# Patient Record
Sex: MALE | Race: WHITE | NOT HISPANIC OR LATINO | Employment: STUDENT | URBAN - METROPOLITAN AREA
[De-identification: names, ages, dates, MRNs, and addresses within clinical notes are randomized per-mention and may not be internally consistent; named-entity substitution may affect disease eponyms.]

---
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
## 2019-12-20 ENCOUNTER — APPOINTMENT (OUTPATIENT)
Dept: RADIOLOGY | Facility: CLINIC | Age: 19
End: 2019-12-20
Payer: COMMERCIAL

## 2019-12-20 ENCOUNTER — OFFICE VISIT (OUTPATIENT)
Dept: OBGYN CLINIC | Facility: CLINIC | Age: 19
End: 2019-12-20
Payer: COMMERCIAL

## 2019-12-20 VITALS
DIASTOLIC BLOOD PRESSURE: 65 MMHG | WEIGHT: 160 LBS | SYSTOLIC BLOOD PRESSURE: 107 MMHG | HEIGHT: 70 IN | BODY MASS INDEX: 22.9 KG/M2 | HEART RATE: 71 BPM

## 2019-12-20 DIAGNOSIS — M25.312 SHOULDER INSTABILITY, LEFT: ICD-10-CM

## 2019-12-20 DIAGNOSIS — S43.015A ANTERIOR DISLOCATION OF LEFT SHOULDER, INITIAL ENCOUNTER: Primary | ICD-10-CM

## 2019-12-20 DIAGNOSIS — M25.512 LEFT SHOULDER PAIN, UNSPECIFIED CHRONICITY: ICD-10-CM

## 2019-12-20 PROCEDURE — 99214 OFFICE O/P EST MOD 30 MIN: CPT | Performed by: ORTHOPAEDIC SURGERY

## 2019-12-20 PROCEDURE — 73030 X-RAY EXAM OF SHOULDER: CPT

## 2019-12-20 NOTE — PROGRESS NOTES
Assessment/Plan:  1  Anterior dislocation of left shoulder, initial encounter  XR shoulder 2+ vw left    MRI arthrogram left shoulder    FL injection left shoulder (arthrogram)   2  Shoulder instability, left  MRI arthrogram left shoulder    FL injection left shoulder (arthrogram)     Criss Aviles has left-sided shoulder pain consistent with shoulder dislocation and appears to have glenohumeral instability with multiple dislocations since that injury  I am concerned of his instability in the shoulder that he may have an underlying labral injury  Given his physical demand in the [employer] it is warranted to obtain an MR arthrogram at this time to rule out labral tear  If he does not have an underlying labral tear we could consider having him begin conservative treatment of physical therapy  I will see him back in the office after the MRI is complete  Subjective:   Mady Chavez  is a 20 y o  male who presents to the office for evaluation for left shoulder injury  He states that he had a first-time shoulder dislocation around 1 month ago  He was wrestling with a friend and his shoulder popped out and then popped right back in  He had pain and discomfort in the left shoulder for the next few days  He then had a second shoulder dislocation which occurred 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  He had his shoulder reduced by 1 of the on call docs in the [employer]  He did not have any x-rays at that time  He states that he has had ongoing shoulder pain ever since that injury  His pain is aching and throbbing and constant over the anterior aspect of the left shoulder  It worsens with any overhead movement or lifting his arm to his side  He denies any numbness and tingling down his arm to his hand  He does have a history of injury to this left shoulder over 1 year ago consistent with an AC sprain and partial trap injury  This was treated conservatively    He has never reported any previous shoulder dislocation in the past       Review of Systems      Past Medical History:   Diagnosis Date    Concussion        History reviewed  No pertinent surgical history  Family History   Problem Relation Age of Onset    No Known Problems Mother     No Known Problems Father     No Known Problems Sister     No Known Problems Brother     No Known Problems Maternal Aunt     No Known Problems Maternal Uncle     No Known Problems Paternal Aunt     No Known Problems Paternal Uncle     No Known Problems Maternal Grandmother     No Known Problems Maternal Grandfather     No Known Problems Paternal Grandmother        Social History     Occupational History    Not on file   Tobacco Use    Smoking status: Never Smoker    Smokeless tobacco: Never Used   Substance and Sexual Activity    Alcohol use: No    Drug use: No    Sexual activity: Not on file       No current outpatient medications on file  No Known Allergies    Objective:  Vitals:    12/20/19 1347   BP: 107/65   Pulse: 71       Left Shoulder Exam     Tenderness   Left shoulder tenderness location: Tenderness to palpation over anterior and lateral aspect of left shoulder  Range of Motion   Active abduction:  80 abnormal   Passive abduction:  110 abnormal   Forward flexion:  130 abnormal   Internal rotation 0 degrees:  Sacrum abnormal     Muscle Strength   Abduction: 5/5   Internal rotation: 5/5   External rotation: 5/5   Supraspinatus: 5/5   Subscapularis: 5/5   Biceps: 5/5     Tests   Apprehension: positive  Barney test: positive  Impingement: positive  Drop arm: negative    Other   Erythema: absent  Sensation: normal  Pulse: present     Comments:  Positive Saltillo's test            Physical Exam   Constitutional: He is oriented to person, place, and time  He appears well-developed and well-nourished  HENT:   Head: Normocephalic and atraumatic  Eyes: Pupils are equal, round, and reactive to light   Conjunctivae are normal    Neck: Normal range of motion  Neck supple  Cardiovascular: Normal rate and intact distal pulses  Pulmonary/Chest: Effort normal  No respiratory distress  Musculoskeletal:   As noted in HPI   Neurological: He is alert and oriented to person, place, and time  Skin: Skin is warm and dry  Psychiatric: He has a normal mood and affect  His behavior is normal    Nursing note and vitals reviewed  I have personally reviewed pertinent films in PACS and my interpretation is as follows: Three-view x-rays left shoulder in the office today demonstrates no evidence of acute fracture or significant abnormality

## 2020-07-02 ENCOUNTER — NURSE TRIAGE (OUTPATIENT)
Dept: OTHER | Facility: OTHER | Age: 20
End: 2020-07-02

## 2020-07-02 DIAGNOSIS — Z20.828 SARS-ASSOCIATED CORONAVIRUS EXPOSURE: ICD-10-CM

## 2020-07-02 DIAGNOSIS — Z20.828 SARS-ASSOCIATED CORONAVIRUS EXPOSURE: Primary | ICD-10-CM

## 2020-07-02 PROCEDURE — U0003 INFECTIOUS AGENT DETECTION BY NUCLEIC ACID (DNA OR RNA); SEVERE ACUTE RESPIRATORY SYNDROME CORONAVIRUS 2 (SARS-COV-2) (CORONAVIRUS DISEASE [COVID-19]), AMPLIFIED PROBE TECHNIQUE, MAKING USE OF HIGH THROUGHPUT TECHNOLOGIES AS DESCRIBED BY CMS-2020-01-R: HCPCS

## 2020-07-02 NOTE — TELEPHONE ENCOUNTER
Pt asymptomatic  Returned from vacation at [facility] which has been designated as outbreak spot  Requires testing       Reason for Disposition   [1] Travel from area with community spread (identified by [facility]) AND [2] within last 14 days BUT [3] NO symptoms    Protocols used: CORONAVIRUS (COVID-19) EXPOSURE-ADULT-[place]

## 2020-07-02 NOTE — TELEPHONE ENCOUNTER
Regarding: Coronavirus  ----- Message from Chance Ly sent at 7/2/2020  8:34 AM EDT -----  Destiny Argueta to [facility] and needs to be tested for work

## 2020-07-10 LAB — SARS-COV-2 RNA SPEC QL NAA+PROBE: NOT DETECTED

## 2020-08-07 ENCOUNTER — NURSE TRIAGE (OUTPATIENT)
Dept: OTHER | Facility: OTHER | Age: 20
End: 2020-08-07

## 2020-08-07 DIAGNOSIS — Z20.828 SARS-ASSOCIATED CORONAVIRUS EXPOSURE: Primary | ICD-10-CM

## 2020-08-08 DIAGNOSIS — Z20.828 SARS-ASSOCIATED CORONAVIRUS EXPOSURE: ICD-10-CM

## 2020-08-08 PROCEDURE — U0003 INFECTIOUS AGENT DETECTION BY NUCLEIC ACID (DNA OR RNA); SEVERE ACUTE RESPIRATORY SYNDROME CORONAVIRUS 2 (SARS-COV-2) (CORONAVIRUS DISEASE [COVID-19]), AMPLIFIED PROBE TECHNIQUE, MAKING USE OF HIGH THROUGHPUT TECHNOLOGIES AS DESCRIBED BY CMS-2020-01-R: HCPCS | Performed by: FAMILY MEDICINE

## 2020-08-08 NOTE — TELEPHONE ENCOUNTER
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

## 2020-08-08 NOTE — TELEPHONE ENCOUNTER
Reason for Disposition   [1] COVID-19 EXPOSURE (Close Contact) within last 14 days AND [2] needs COVID-19 lab test to return to work AND [3] NO symptoms    Answer Assessment - Initial Assessment Questions  1  CLOSE CONTACT: "Who is the person with the confirmed or suspected COVID-19 infection that you were exposed to?"      His roommate came back positive  2  PLACE of CONTACT: "Where were you when you were exposed to COVID-19?" (e g , home, school, medical waiting room; which city?)     Training [address]  3  TYPE of CONTACT: "How much contact was there?" (e g , sitting next to, live in same house, work in same office, same building)      Lived in the same barrack  4  DURATION of CONTACT: "How long were you in contact with the COVID-19 patient?" (e g , a few seconds, passed by person, a few minutes, live with the patient)      Hours at a time   5  DATE of CONTACT: "When did you have contact with a COVID-19 patient?" (e g , how many days ago)      The last week they where together  6  TRAVEL: "Have you traveled out of the country recently?" If so, "When and where?"      * Also ask about out-of-state travel, since the CDC has identified some high-risk cities for community spread in the [address] [place] [address],[address]  * Note: Travel becomes less relevant if there is widespread community transmission where the patient lives  no  7  COMMUNITY SPREAD: "Are there lots of cases of COVID-19 (community spread) where you live?" (See public health department website, if unsure)        Lives in [address]  8  SYMPTOMS: "Do you have any symptoms?" (e g , fever, cough, breathing difficulty)     none  9  PREGNANCY OR POSTPARTUM: "Is there any chance you are pregnant?" "When was your last menstrual period?" "Did you deliver in the last 2 weeks?"      n/a  10  HIGH RISK: "Do you have any heart or lung problems?  Do you have a weak immune system?" (e g , CHF, COPD, asthma, HIV positive, chemotherapy, renal failure, diabetes mellitus, sickle cell anemia)        none    Protocols used: CORONAVIRUS (COVID-19) EXPOSURE-ADULT-AH

## 2020-08-10 LAB — SARS-COV-2 RNA SPEC QL NAA+PROBE: NOT DETECTED

## 2020-11-13 ENCOUNTER — OFFICE VISIT (OUTPATIENT)
Dept: URGENT CARE | Facility: CLINIC | Age: 20
End: 2020-11-13
Payer: COMMERCIAL

## 2020-11-13 VITALS
DIASTOLIC BLOOD PRESSURE: 62 MMHG | SYSTOLIC BLOOD PRESSURE: 124 MMHG | TEMPERATURE: 102.2 F | HEIGHT: 70 IN | RESPIRATION RATE: 16 BRPM | OXYGEN SATURATION: 97 % | WEIGHT: 161.2 LBS | BODY MASS INDEX: 23.08 KG/M2 | HEART RATE: 105 BPM

## 2020-11-13 DIAGNOSIS — J02.9 SORE THROAT: Primary | ICD-10-CM

## 2020-11-13 LAB — S PYO AG THROAT QL: NEGATIVE

## 2020-11-13 PROCEDURE — 99213 OFFICE O/P EST LOW 20 MIN: CPT | Performed by: FAMILY MEDICINE

## 2020-11-13 PROCEDURE — 87880 STREP A ASSAY W/OPTIC: CPT | Performed by: FAMILY MEDICINE

## 2020-11-13 PROCEDURE — 87147 CULTURE TYPE IMMUNOLOGIC: CPT | Performed by: FAMILY MEDICINE

## 2020-11-13 PROCEDURE — 87070 CULTURE OTHR SPECIMN AEROBIC: CPT | Performed by: FAMILY MEDICINE

## 2020-11-13 RX ORDER — PENICILLIN V POTASSIUM 500 MG/1
500 TABLET ORAL EVERY 12 HOURS
Qty: 20 TABLET | Refills: 0 | Status: SHIPPED | OUTPATIENT
Start: 2020-11-13 | End: 2020-11-23

## 2020-11-15 LAB — BACTERIA THROAT CULT: ABNORMAL

## 2022-01-04 ENCOUNTER — OFFICE VISIT (OUTPATIENT)
Dept: FAMILY MEDICINE CLINIC | Facility: CLINIC | Age: 22
End: 2022-01-04
Payer: COMMERCIAL

## 2022-01-04 VITALS
RESPIRATION RATE: 18 BRPM | HEART RATE: 74 BPM | WEIGHT: 160 LBS | DIASTOLIC BLOOD PRESSURE: 60 MMHG | HEIGHT: 71 IN | BODY MASS INDEX: 22.4 KG/M2 | OXYGEN SATURATION: 98 % | TEMPERATURE: 98.6 F | SYSTOLIC BLOOD PRESSURE: 100 MMHG

## 2022-01-04 DIAGNOSIS — R22.0 SUBMANDIBULAR SWELLING: Primary | ICD-10-CM

## 2022-01-04 DIAGNOSIS — R22.1 SUBMANDIBULAR SWELLING: Primary | ICD-10-CM

## 2022-01-04 PROCEDURE — 99213 OFFICE O/P EST LOW 20 MIN: CPT | Performed by: NURSE PRACTITIONER

## 2022-01-04 NOTE — PROGRESS NOTES
Assessment/Plan:    1  Submandibular swelling  Comments:  salivary gland infection in differential when patient gets symptoms due to possible salivary gland obstruction, advised on supportive care and advised to follow with ENT  Orders:  -     Ambulatory Referral to Otolaryngology; Future          BMI Counseling: Body mass index is 22 32 kg/m²  Discussed the patient's BMI with him  Patient Instructions:  Suck on sugar free faustino and follow with ENT  Supportive care discussed and advised  Advised to RTO for any worsening and no improvement  Follow up for no improvement and worsening of conditions  Patient advised and educated when to see immediate medical care  Return if symptoms worsen or fail to improve  Future Appointments   Date Time Provider Jose D Castellon   1/6/2022  9:00 AM Rachna Miller MD [facility]           Subjective:      Patient ID: Rosalina Good  is a 24 y o  male  Chief Complaint   Patient presents with    Facial Swelling     left jaw swelling times 2 months    sas/cma         Vitals:  /60   Pulse 74   Temp 98 6 °F (37 °C)   Resp 18   Ht 5' 11" (1 803 m)   Wt 72 6 kg (160 lb)   SpO2 98%   BMI 22 32 kg/m²     HPI  New to practice  Personal and family medical history reviewed  Denies family h/o breast cancer and colon cancer  Had dental work done in summer in 2021 and antibiotics were prescribed and then couple of days later noticed swelling under left jaw and went to ER and did CT scan which came back normal and then prescribed other dose of antibiotic  Stated that since then area gets swollen under left jaw and pain radiates to jaw line and feels there is growth  Denies fever, chills and weight loss    Lives in [place] and visiting family here          PHQ-2/9 Depression Screening    Little interest or pleasure in doing things: 0 - not at all  Feeling down, depressed, or hopeless: 0 - not at all             The following portions of the patient's history were reviewed and updated as appropriate: allergies, current medications, past family history, past medical history, past social history, past surgical history and problem list       Review of Systems   Constitutional: Negative for chills, diaphoresis, fatigue, fever and unexpected weight change  HENT: Negative for congestion, dental problem, drooling, ear discharge, ear pain, facial swelling, hearing loss, mouth sores, nosebleeds, postnasal drip, rhinorrhea, sinus pressure, sinus pain, sneezing, sore throat, tinnitus, trouble swallowing and voice change  As noted in HPI     Respiratory: Negative for cough, chest tightness, shortness of breath and wheezing  Cardiovascular: Negative  Gastrointestinal: Negative for abdominal pain, constipation, diarrhea, nausea and vomiting  Musculoskeletal: Negative  Skin: Negative  Neurological: Negative for dizziness, light-headedness and headaches  Objective:    Social History     Tobacco Use   Smoking Status Current Every Day Smoker   Smokeless Tobacco Never Used   Tobacco Comment    vapes       Allergies: No Known Allergies      No current outpatient medications on file  No current facility-administered medications for this visit  Physical Exam  Vitals reviewed  Constitutional:       Appearance: Normal appearance  He is well-developed  HENT:      Head: Normocephalic  Right Ear: Tympanic membrane, ear canal and external ear normal       Left Ear: Tympanic membrane, ear canal and external ear normal       Nose: Nose normal       Right Sinus: No maxillary sinus tenderness or frontal sinus tenderness  Left Sinus: No maxillary sinus tenderness or frontal sinus tenderness  Mouth/Throat:      Lips: Pink  Mouth: No oral lesions  Pharynx: No oropharyngeal exudate or posterior oropharyngeal erythema  Neck:      Thyroid: No thyromegaly or thyroid tenderness     Cardiovascular: Rate and Rhythm: Normal rate and regular rhythm  Heart sounds: Normal heart sounds  Pulmonary:      Effort: Pulmonary effort is normal       Breath sounds: Normal breath sounds  Abdominal:      General: Bowel sounds are normal       Tenderness: There is no abdominal tenderness  There is no rebound  Musculoskeletal:         General: Normal range of motion  Cervical back: Neck supple  No edema or rigidity  No pain with movement  Normal range of motion  Lymphadenopathy:      Cervical: No cervical adenopathy  Right cervical: No superficial or posterior cervical adenopathy  Left cervical: No superficial or posterior cervical adenopathy  Skin:     General: Skin is warm and dry  Neurological:      Mental Status: He is alert and oriented to person, place, and time  Psychiatric:         Behavior: Behavior normal          Thought Content:  Thought content normal          Judgment: Judgment normal                      MARCUS Ridley

## 2022-01-04 NOTE — PATIENT INSTRUCTIONS
Suck on sugar free faustino and follow with ENT  Supportive care discussed and advised  Advised to RTO for any worsening and no improvement  Follow up for no improvement and worsening of conditions  Patient advised and educated when to see immediate medical care

## 2022-01-06 ENCOUNTER — OFFICE VISIT (OUTPATIENT)
Dept: OTOLARYNGOLOGY | Facility: CLINIC | Age: 22
End: 2022-01-06
Payer: COMMERCIAL

## 2022-01-06 VITALS — WEIGHT: 160 LBS | TEMPERATURE: 98.4 F | HEIGHT: 71 IN | BODY MASS INDEX: 22.4 KG/M2

## 2022-01-06 DIAGNOSIS — R22.0 SUBMANDIBULAR SWELLING: ICD-10-CM

## 2022-01-06 DIAGNOSIS — K11.5 SIALOLITHIASIS OF SUBMANDIBULAR GLAND: Primary | ICD-10-CM

## 2022-01-06 DIAGNOSIS — K11.20 SIALOADENITIS: ICD-10-CM

## 2022-01-06 DIAGNOSIS — R22.1 SUBMANDIBULAR SWELLING: ICD-10-CM

## 2022-01-06 PROCEDURE — 1036F TOBACCO NON-USER: CPT | Performed by: OTOLARYNGOLOGY

## 2022-01-06 PROCEDURE — 3008F BODY MASS INDEX DOCD: CPT | Performed by: OTOLARYNGOLOGY

## 2022-01-06 PROCEDURE — 99203 OFFICE O/P NEW LOW 30 MIN: CPT | Performed by: OTOLARYNGOLOGY

## 2022-01-06 NOTE — PROGRESS NOTES
[facility] [address] [facility] [facility] Otolaryngology      Otolaryngology -- Head and Neck Surgery New Patient Visit    Arleth Dunaway  is a 24 y o  who presents with a chief complaint of recurrent left neck swelling    Pertinent elements of the history include:  He presents with recurrent left submandibular swelling  Symptoms started after a dental extraction about 6 months ago  He had an infected left lower arch tooth that was untreated for 9 months while he was on deployment in [place]  After returning home he had the tooth extracted and then developed subsequent left neck swelling  A CT was performed at the time that demonstrated lymphadenopathy but no masses  The report was reviewed  Since then he gets recurrent left neck swelling once or twice a week usually in the morning upon awakening  Symptoms will last for several days and will be tender and painful  Swelling tends to go down on its own  He was initially treated with antibiotics at the onset of symptoms months ago without improvement  Review of systems: Pertinent review of systems documented in the HPI  10 point ROS documented in a separate note, as necessary  Results reviewed; images from any scan have been personally reviewed:    Report reviewed ct neck w contrast from June 2021      The past medical, surgical, social and family history have been reviewed as documented in today's record      Past Medical History:   Diagnosis Date    Closed fracture of lumbar vertebra ([address] ) 10/4/2013    Concussion     Concussion without loss of consciousness 9/21/2016       Past Surgical History:   Procedure Laterality Date    APPENDECTOMY         Family History   Problem Relation Age of Onset    No Known Problems Mother     No Known Problems Father     No Known Problems Sister     No Known Problems Brother     No Known Problems Maternal Aunt     No Known Problems Maternal Uncle     No Known Problems Paternal Aunt     No Known Problems Paternal Uncle     No Known Problems Maternal Grandmother     No Known Problems Maternal Grandfather     No Known Problems Paternal Grandmother        Social History     Socioeconomic History    Marital status: Single     Spouse name: Not on file    Number of children: Not on file    Years of education: Not on file    Highest education level: Not on file   Occupational History    Not on file   Tobacco Use    Smoking status: Never Smoker    Smokeless tobacco: Never Used    Tobacco comment: vapes   Vaping Use    Vaping Use: Every day    Substances: Nicotine, Flavoring   Substance and Sexual Activity    Alcohol use: Yes     Comment: rare    Drug use: No    Sexual activity: Not on file   Other Topics Concern    Not on file   Social History Narrative    Not on file     Social Determinants of Health     Financial Resource Strain: Not on file   Food Insecurity: Not on file   Transportation Needs: Not on file   Physical Activity: Not on file   Stress: Not on file   Social Connections: Not on file   Intimate Partner Violence: Not on file   Housing Stability: Not on file       No current outpatient medications on file prior to visit  No current facility-administered medications on file prior to visit  Physical exam:   Temp 98 4 °F (36 9 °C) (Temporal)   Ht 5' 11" (1 803 m)   Wt 72 6 kg (160 lb)   BMI 22 32 kg/m²     Constitutional:  Well developed, well nourished and groomed, in no acute distress  Eyes:  Extra-ocular movements intact, pupils equally round and reactive to light and accommodation, the lids and conjunctivae are normal in appearance  Head: Atraumatic, normocephalic, no visible scalp lesions, bony palpation unremarkable without stepoffs, parotid and submandibular salivary glands non-tender to palpation and without masses bilaterally        Ears:  Auricles normal in appearance bilaterally, mastoid prominence non-tender, external auditory canals clear bilaterally  Tympanic membranes intact  Normal appearing ossicles  Nose/Sinuses:  External appearance unremarkable, no maxillary or frontal sinus tenderness to palpation bilaterally  Anterior rhinoscopy reveals: normal mucosa     Oral Cavity:  Moist mucus membranes, gums and dentition unremarkable, no oral mucosal masses or lesions, floor of mouth soft, tongue mobile without masses or lesions  Oropharynx:  Base of tongue soft and without masses, tonsils bilaterally unremarkable, soft palate mucosa unremarkable  Neck:  No visible or palpable cervical lesions or lymphadenopathy, thyroid gland is normal in size and symmetry and without masses, normal laryngeal elevation with swallowing  Cardiovascular:  Normal rate and rhythm, no palpable thrills, no jugulovenous distension observed  Respiratory:  Normal respiratory effort without evidence of retractions or use of accessory muscles  Integument:  Normal appearing without observed masses or lesions  Neurologic:  Cranial nerves II-XII intact bilaterally  Psychiatric:  Alert and oriented to time, place and person, normal affect  Procedures        Assessment:   1  Submandibular swelling  Ambulatory Referral to Otolaryngology    salivary gland infection in differential when patient gets symptoms due to possible salivary gland obstruction, advised on supportive care and advised to follow       Orders  No orders of the defined types were placed in this encounter  Discussion/Plan:    1  His exam was unremarkable today  There is clear saliva expressed from Donovan's papilla on the left side with palpation of the gland of the gland itself is tender to palpation but not particularly enlarged  I suspect the etiology of his symptoms include a longstanding left submandibular soft tissue infection related to the dental infection that ultimately lead to scarring of the duct    Thus, he would benefit from a sialogram as his prior CT neck was unremarkable  Should the sialogram demonstrate a stone or stricture he would benefit from sialoendoscopy for removal and relief of the blockage

## 2022-01-20 ENCOUNTER — HOSPITAL ENCOUNTER (OUTPATIENT)
Dept: RADIOLOGY | Facility: HOSPITAL | Age: 22
Discharge: HOME | End: 2022-01-20
Attending: OTOLARYNGOLOGY

## 2022-01-20 ENCOUNTER — HOSPITAL ENCOUNTER (OUTPATIENT)
Dept: RADIOLOGY | Facility: HOSPITAL | Age: 22
Discharge: HOME | End: 2022-01-20
Attending: OTOLARYNGOLOGY | Admitting: RADIOLOGY
Payer: COMMERCIAL

## 2022-01-20 DIAGNOSIS — K11.5 SIALOLITHIASIS OF SUBMANDIBULAR GLAND: ICD-10-CM

## 2022-01-20 DIAGNOSIS — R22.0 SUBMANDIBULAR SWELLING: ICD-10-CM

## 2022-01-20 DIAGNOSIS — K11.5: ICD-10-CM

## 2022-01-20 DIAGNOSIS — R22.1 SUBMANDIBULAR SWELLING: ICD-10-CM

## 2022-01-20 DIAGNOSIS — K11.20 SIALOADENITIS: ICD-10-CM

## 2022-01-20 PROCEDURE — 70390 X-RAY EXAM OF SALIVARY DUCT: CPT

## 2022-01-20 PROCEDURE — 42550 INJECTION FOR SALIVARY X-RAY: CPT

## 2022-01-20 RX ADMIN — IOHEXOL 4 ML: 240 INJECTION, SOLUTION INTRATHECAL; INTRAVASCULAR; INTRAVENOUS; ORAL at 14:10

## 2022-01-24 ENCOUNTER — TELEPHONE (OUTPATIENT)
Dept: OTOLARYNGOLOGY | Facility: CLINIC | Age: 22
End: 2022-01-24

## 2022-01-24 NOTE — TELEPHONE ENCOUNTER
Mother called stating that her son's jaw is locked and can not get food in  He is also in  A lot of pain  He did all of his exercises and heating pad but the pain is getting worse    Can you pleas call him at [phone]